# Patient Record
Sex: FEMALE | Race: WHITE | NOT HISPANIC OR LATINO | Employment: FULL TIME | URBAN - METROPOLITAN AREA
[De-identification: names, ages, dates, MRNs, and addresses within clinical notes are randomized per-mention and may not be internally consistent; named-entity substitution may affect disease eponyms.]

---

## 2022-07-17 ENCOUNTER — OFFICE VISIT (OUTPATIENT)
Dept: URGENT CARE | Facility: URGENT CARE | Age: 27
End: 2022-07-17
Payer: COMMERCIAL

## 2022-07-17 VITALS
OXYGEN SATURATION: 99 % | TEMPERATURE: 98.5 F | SYSTOLIC BLOOD PRESSURE: 130 MMHG | HEART RATE: 113 BPM | WEIGHT: 239 LBS | DIASTOLIC BLOOD PRESSURE: 80 MMHG

## 2022-07-17 DIAGNOSIS — N76.0 VAGINITIS AND VULVOVAGINITIS: ICD-10-CM

## 2022-07-17 DIAGNOSIS — H66.004 RECURRENT ACUTE SUPPURATIVE OTITIS MEDIA OF RIGHT EAR WITHOUT SPONTANEOUS RUPTURE OF TYMPANIC MEMBRANE: Primary | ICD-10-CM

## 2022-07-17 DIAGNOSIS — Z86.69 HISTORY OF MASTOIDITIS: ICD-10-CM

## 2022-07-17 PROCEDURE — 99204 OFFICE O/P NEW MOD 45 MIN: CPT | Performed by: FAMILY MEDICINE

## 2022-07-17 RX ORDER — CEFDINIR 300 MG/1
300 CAPSULE ORAL 2 TIMES DAILY
Qty: 20 CAPSULE | Refills: 0 | Status: SHIPPED | OUTPATIENT
Start: 2022-07-17 | End: 2022-07-27

## 2022-07-17 RX ORDER — FLUCONAZOLE 150 MG/1
150 TABLET ORAL ONCE
Qty: 1 TABLET | Refills: 0 | Status: SHIPPED | OUTPATIENT
Start: 2022-07-17 | End: 2022-07-17

## 2022-07-17 NOTE — PROGRESS NOTES
Chief complaint: right ear pain    4-5 days ago noticed decreased hearing on right ear  Last night started hurt  Woke up at 3 am severe pain and has jorje constant since then    Has a history of recurrent ear infections with tubes and reconstructive surgery both ears   Most recent infection right ear     No swimming   Other symptoms: no cough, colds, sinus congestion  Fever: No  Tried over the counter medications without relief  No fevers or chills chest pain or shortness of breath   No rash  Ill-contacts: none     Hurts to talk to eat and hurts all in the right side of the head  Denies any possibility of pregnancy declined a pregnancy test      Because of persistent and worsening symptoms came in to be seen    Problem list and histories reviewed & adjusted, as indicated.  Additional history: as documented    Problem list, Medication list, Allergies, and Medical/Social/Surgical histories reviewed in EPIC and updated as appropriate.    ROS:  Constitutional, HEENT, cardiovascular, pulmonary, gi and gu systems are negative, except as otherwise noted.    OBJECTIVE:                                                    /80   Pulse 113   Temp 98.5  F (36.9  C) (Tympanic)   Wt 108.4 kg (239 lb)   SpO2 99%   There is no height or weight on file to calculate BMI.  GENERAL: healthy, alert and no distress  EYES: pink palpebral conjunctiva, anicteric sclera, pupils equally reactive to light and accomodation, extraocular muscles intact full and equal.  ENT: midline nasal septum, no  nasal congestion   Left ear:no tragal tenderness, no mastoid tenderness normal tympaninc membrane   Right ear: no tragal tenderness, no mastoid tenderness erythematous and yellowish effusion tympaninc membrane   NECK: no adenopathy, no asymmetry or  Casie  MS: no gross musculoskeletal defects noted, no edema  NEURO: Normal strength and tone, mentation intact and speech normal    Diagnostic Test Results:  No results found for this or any previous  visit (from the past 24 hour(s)).     ASSESSMENT/PLAN:                                                          ICD-10-CM    1. Recurrent acute suppurative otitis media of right ear without spontaneous rupture of tympanic membrane  H66.004 cefdinir (OMNICEF) 300 MG capsule   2. Vaginitis and vulvovaginitis  N76.0 fluconazole (DIFLUCAN) 150 MG tablet   3. History of mastoiditis  Z86.69      Prescribed with omnicef   Warned about possible cross-reactivity/allergy of cephalosporins with amoxicillin. Patient did not have any life-threatening reaction to amoxicillin and will be monitoring for any reaction.  Has tolerated cephalosporins in the past.   Recommend follow up with primary care provider if no relief in 3 days, sooner if worse  Adverse reactions of medications discussed.  Over the counter medications discussed.   Aware to come back in if with worsening symptoms or if no relief despite treatment plan  Patient voiced understanding and had no further questions.     Patient with history of complicated and recurrent ear infections with history of mastoiditis  Low clinical suspicion at this time  However if symptoms worsen, or if with temporal or mastoid bone tenderness recommend advanced imaging and further evaluation either in ER or [facility].  Patient agreed and voiced understnading  Alarm signs or symptoms discussed, if present recommend go to ER     Patient requested prescription for fluconazole as needed if she develops a yeast vaginitis. She says that she gets a yeast infection with antibiotics  But if persist, recommend being seen.       MD Marianne Hunt MD  [facility]